# Patient Record
Sex: FEMALE | Race: WHITE | NOT HISPANIC OR LATINO | URBAN - METROPOLITAN AREA
[De-identification: names, ages, dates, MRNs, and addresses within clinical notes are randomized per-mention and may not be internally consistent; named-entity substitution may affect disease eponyms.]

---

## 2020-07-08 ENCOUNTER — OFFICE VISIT (OUTPATIENT)
Dept: URBAN - METROPOLITAN AREA MEDICAL CENTER 30 | Facility: MEDICAL CENTER | Age: 80
End: 2020-07-08

## 2021-07-07 ENCOUNTER — OFFICE VISIT (OUTPATIENT)
Dept: URBAN - METROPOLITAN AREA CLINIC 74 | Facility: CLINIC | Age: 81
End: 2021-07-07
Payer: MEDICARE

## 2021-07-07 ENCOUNTER — WEB ENCOUNTER (OUTPATIENT)
Dept: URBAN - METROPOLITAN AREA CLINIC 74 | Facility: CLINIC | Age: 81
End: 2021-07-07

## 2021-07-07 DIAGNOSIS — R13.10 PILL DYSPHAGIA: ICD-10-CM

## 2021-07-07 DIAGNOSIS — K21.9 GASTROESOPHAGEAL REFLUX DISEASE, UNSPECIFIED WHETHER ESOPHAGITIS PRESENT: ICD-10-CM

## 2021-07-07 PROCEDURE — 99203 OFFICE O/P NEW LOW 30 MIN: CPT | Performed by: STUDENT IN AN ORGANIZED HEALTH CARE EDUCATION/TRAINING PROGRAM

## 2021-07-07 RX ORDER — SUCRALFATE 1 G
1 TABLET ON AN EMPTY STOMACH TABLET ORAL QID
Qty: 120 TABLET | Refills: 3 | OUTPATIENT
Start: 2021-07-07 | End: 2021-11-04

## 2021-07-07 RX ORDER — NICOTINE POLACRILEX 2 MG
GUM BUCCAL
Qty: 0 | Refills: 0 | Status: DISCONTINUED | COMMUNITY
Start: 1900-01-01

## 2021-07-07 RX ORDER — ATENOLOL 25 MG/1
TABLET ORAL
Qty: 0 | Refills: 0 | Status: DISCONTINUED | COMMUNITY
Start: 1900-01-01

## 2021-07-07 NOTE — HPI-TODAY'S VISIT:
81-year-old female New to me Comes in to see me for evaluation of intermittent dysphagia specifically to the pill and upper abdomen discomfort. Patient has been on Eliquis for a long time but recently was switched to Pradaxa due to financial reasons.  Since she has started taking Pradaxa, she has experienced difficulty in swallowing the pill with some upper abdomen pain, indigestion and nausea.  This feeling of choking that happens with the pill sometimes also happen with liquids. Patient is little anxious at baseline and is trying to get back on Xarelto as she feels everything happened because of Pradaxa. Otherwise patient denies any abdomen pain.  No reflux.  Does not use PPI at baseline.  Regular bowel movements.  No constipation or diarrhea.  No blood in the stool.

## 2021-10-31 ENCOUNTER — OUT OF OFFICE VISIT (OUTPATIENT)
Dept: URBAN - METROPOLITAN AREA MEDICAL CENTER 30 | Facility: MEDICAL CENTER | Age: 81
End: 2021-10-31
Payer: MEDICARE

## 2021-10-31 ENCOUNTER — TELEPHONE ENCOUNTER (OUTPATIENT)
Dept: URBAN - METROPOLITAN AREA CLINIC 74 | Facility: CLINIC | Age: 81
End: 2021-10-31

## 2021-10-31 DIAGNOSIS — R13.19 CERVICAL DYSPHAGIA: ICD-10-CM

## 2021-10-31 DIAGNOSIS — I48.91 A-FIB: ICD-10-CM

## 2021-10-31 DIAGNOSIS — K21.9 ACID REFLUX: ICD-10-CM

## 2021-10-31 PROCEDURE — G8427 DOCREV CUR MEDS BY ELIG CLIN: HCPCS | Performed by: INTERNAL MEDICINE

## 2021-10-31 PROCEDURE — 99221 1ST HOSP IP/OBS SF/LOW 40: CPT | Performed by: INTERNAL MEDICINE

## 2021-11-02 ENCOUNTER — OUT OF OFFICE VISIT (OUTPATIENT)
Dept: URBAN - METROPOLITAN AREA MEDICAL CENTER 30 | Facility: MEDICAL CENTER | Age: 81
End: 2021-11-02
Payer: MEDICARE

## 2021-11-02 DIAGNOSIS — K21.9 ACID REFLUX: ICD-10-CM

## 2021-11-02 DIAGNOSIS — R13.12 DYSPHAGIA: ICD-10-CM

## 2021-11-02 PROCEDURE — G8427 DOCREV CUR MEDS BY ELIG CLIN: HCPCS | Performed by: INTERNAL MEDICINE

## 2021-11-02 PROCEDURE — 99233 SBSQ HOSP IP/OBS HIGH 50: CPT | Performed by: INTERNAL MEDICINE

## 2021-11-02 PROCEDURE — 99222 1ST HOSP IP/OBS MODERATE 55: CPT | Performed by: INTERNAL MEDICINE

## 2022-05-10 PROBLEM — 399122003: Status: ACTIVE | Noted: 2021-07-07

## 2022-05-12 ENCOUNTER — LAB OUTSIDE AN ENCOUNTER (OUTPATIENT)
Dept: URBAN - METROPOLITAN AREA CLINIC 74 | Facility: CLINIC | Age: 82
End: 2022-05-12

## 2022-05-12 ENCOUNTER — OFFICE VISIT (OUTPATIENT)
Dept: URBAN - METROPOLITAN AREA CLINIC 74 | Facility: CLINIC | Age: 82
End: 2022-05-12
Payer: MEDICARE

## 2022-05-12 DIAGNOSIS — R10.31 RLQ ABDOMINAL PAIN: ICD-10-CM

## 2022-05-12 DIAGNOSIS — Z87.19 HISTORY OF DYSPHAGIA: ICD-10-CM

## 2022-05-12 DIAGNOSIS — K21.9 GASTROESOPHAGEAL REFLUX DISEASE, UNSPECIFIED WHETHER ESOPHAGITIS PRESENT: ICD-10-CM

## 2022-05-12 DIAGNOSIS — R19.4 CHANGE IN BOWEL HABIT: ICD-10-CM

## 2022-05-12 PROCEDURE — 99214 OFFICE O/P EST MOD 30 MIN: CPT | Performed by: INTERNAL MEDICINE

## 2022-05-12 NOTE — HPI-TODAY'S VISIT:
81-year-old female New to me Comes in to see me for evaluation of intermittent dysphagia specifically to the pill and upper abdomen discomfort. Patient has been on Eliquis for a long time but recently was switched to Pradaxa due to financial reasons.  Since she has started taking Pradaxa, she has experienced difficulty in swallowing the pill with some upper abdomen pain, indigestion and nausea.  This feeling of choking that happens with the pill sometimes also happen with liquids. Patient is little anxious at baseline and is trying to get back on Xarelto as she feels everything happened because of Pradaxa. Otherwise patient denies any abdomen pain.  No reflux.  Does not use PPI at baseline.  Regular bowel movements.  No constipation or diarrhea.  No blood in the stool.  Today May 12 2022 the patient returns for a follow-up visit, the patient was last seen in the office on July 7, 2021 by Dr. Guadarrama with gastroesophageal reflux, pill dysphagia.  At the time of the visit the patient stated that he had been taking Eliquis for a long period of time and was recently switched to Pradaxa due to financial reasons.  Once he started taking Pradaxa he experienced difficulty in swallowing the pill along with some upper abdominal pain, indigestion and nausea.  The patient also complained of occasional dysphagia with liquids.  The patient was anxious to get back to Xarelto.  The patient otherwise had no other significant abnormalities, did not take any PPIs and was having normal regular bowel movements.  The patient was started on a PPI by Dr. Guadarrama the patient was encouraged to use Carafate as needed for symptomatic improvement and was told that if symptoms persisted he might need to be scheduled for an EGD.  To the right the patient returns to the office stating that she was hospitalized in February 2022 with COVID-19 and spent several weeks in the hospital.  While in the hospital she had recurrent episodes of nausea and started developing some discomfort over the right lower quadrant of the abdomen.  When she got released from the hospital the intensity and frequency of the pain over the right lower quadrant intensified.  The patient claims that she has had several episodes where she gets pain over the right lower quadrant, it is sharp intense almost disabling, it might last a couple hours, it occurs many times in the evening and along with it she gets some abdominal bloating, the desire to defecate and after straining she will pass either soft or liquid stool 1 or 2 times.  Once she empties out the pain gradually improves and then she will not have another episode for either weeks or days at the time.  The patient claimed having some nausea without any vomiting when having these episodes.  The patient denied having any hematochezia, rectal bleeding or melena.  Currently the patient takes Eliquis rather than Pradaxa and ever since she has not had any dysphagia or odynophagia, in the recent past she did have pill dysphagia.  The patient states that she was diagnosed with IBS-D years ago and used to be constipated and was taking MiraLAX on a regular basis so she could defecate.  Since she came out of the hospital she was able to defecate regular stools on a daily basis till she started getting some constipation and started using MiraLAX again.  This abdominal pain and loose stools are independent from using laxatives, any particular food ingestion or any activity.  The patient states that she has lost a significant amount of weight while in the hospital.  The patient will be recommended to get a CT of the abdomen and pelvis with contrast, the patient's last colonoscopy was performed in 2011 and we will try to get the report.  The patient will return for a follow-up visit after she gets the CT scan advised for follow-up.

## 2022-07-28 ENCOUNTER — CLAIMS CREATED FROM THE CLAIM WINDOW (OUTPATIENT)
Dept: URBAN - METROPOLITAN AREA CLINIC 74 | Facility: CLINIC | Age: 82
End: 2022-07-28
Payer: MEDICARE

## 2022-07-28 VITALS
OXYGEN SATURATION: 98 % | TEMPERATURE: 97.6 F | BODY MASS INDEX: 20.49 KG/M2 | HEIGHT: 64 IN | SYSTOLIC BLOOD PRESSURE: 123 MMHG | DIASTOLIC BLOOD PRESSURE: 70 MMHG | WEIGHT: 120 LBS | HEART RATE: 83 BPM

## 2022-07-28 DIAGNOSIS — Z98.890 HISTORY OF CARDIAC RADIOFREQUENCY ABLATION: ICD-10-CM

## 2022-07-28 DIAGNOSIS — Z86.010 PERSONAL HISTORY OF COLONIC POLYPS: ICD-10-CM

## 2022-07-28 DIAGNOSIS — Z79.01 CHRONIC ANTICOAGULATION: ICD-10-CM

## 2022-07-28 DIAGNOSIS — K58.1 IRRITABLE BOWEL SYNDROME WITH CONSTIPATION: ICD-10-CM

## 2022-07-28 DIAGNOSIS — Z86.16 HISTORY OF COVID-19: ICD-10-CM

## 2022-07-28 DIAGNOSIS — R10.31 RLQ ABDOMINAL PAIN: ICD-10-CM

## 2022-07-28 DIAGNOSIS — K21.9 GASTROESOPHAGEAL REFLUX DISEASE, UNSPECIFIED WHETHER ESOPHAGITIS PRESENT: ICD-10-CM

## 2022-07-28 DIAGNOSIS — K55.1 MESENTERIC ARTERY STENOSIS: ICD-10-CM

## 2022-07-28 DIAGNOSIS — R10.813 RIGHT LOWER QUADRANT ABDOMINAL TENDERNESS WITHOUT REBOUND TENDERNESS: ICD-10-CM

## 2022-07-28 DIAGNOSIS — Z80.0 FAMILY HISTORY OF COLON CANCER: ICD-10-CM

## 2022-07-28 DIAGNOSIS — Z95.0 PACEMAKER: ICD-10-CM

## 2022-07-28 DIAGNOSIS — Z87.19 HISTORY OF DYSPHAGIA: ICD-10-CM

## 2022-07-28 DIAGNOSIS — I77.1 CELIAC ARTERY STENOSIS: ICD-10-CM

## 2022-07-28 DIAGNOSIS — Z83.71 FAMILY HISTORY OF COLONIC POLYPS: ICD-10-CM

## 2022-07-28 DIAGNOSIS — Z86.79 HISTORY OF ATRIAL FIBRILLATION: ICD-10-CM

## 2022-07-28 DIAGNOSIS — K22.9 ESOPHAGEAL ABNORMALITY: ICD-10-CM

## 2022-07-28 DIAGNOSIS — R19.4 CHANGE IN BOWEL HABIT: ICD-10-CM

## 2022-07-28 PROBLEM — 448661001: Status: ACTIVE | Noted: 2022-05-12

## 2022-07-28 PROBLEM — 312442005: Status: ACTIVE | Noted: 2022-05-12

## 2022-07-28 PROBLEM — 16916101000119104: Status: ACTIVE | Noted: 2022-05-12

## 2022-07-28 PROBLEM — 16846341000119101: Status: ACTIVE | Noted: 2022-07-26

## 2022-07-28 PROBLEM — 441509002: Status: ACTIVE | Noted: 2022-05-12

## 2022-07-28 PROBLEM — 711150003: Status: ACTIVE | Noted: 2022-05-12

## 2022-07-28 PROBLEM — 301754002: Status: ACTIVE | Noted: 2022-05-12

## 2022-07-28 PROBLEM — 429969003: Status: ACTIVE | Noted: 2022-05-12

## 2022-07-28 PROBLEM — 440630006: Status: ACTIVE | Noted: 2022-05-12

## 2022-07-28 PROBLEM — 427951003: Status: ACTIVE | Noted: 2022-05-12

## 2022-07-28 PROBLEM — 129851009: Status: ACTIVE | Noted: 2022-05-12

## 2022-07-28 PROBLEM — 405545007: Status: ACTIVE | Noted: 2022-07-26

## 2022-07-28 PROBLEM — 235595009 GASTROESOPHAGEAL REFLUX DISEASE: Status: ACTIVE | Noted: 2022-05-10

## 2022-07-28 PROBLEM — 428283002: Status: ACTIVE | Noted: 2022-05-12

## 2022-07-28 PROBLEM — 292508471000119105: Status: ACTIVE | Noted: 2022-05-12

## 2022-07-28 PROCEDURE — 99213 OFFICE O/P EST LOW 20 MIN: CPT | Performed by: INTERNAL MEDICINE

## 2022-07-28 NOTE — HPI-TODAY'S VISIT:
81-year-old female New to me Comes in to see me for evaluation of intermittent dysphagia specifically to the pill and upper abdomen discomfort. Patient has been on Eliquis for a long time but recently was switched to Pradaxa due to financial reasons.  Since she has started taking Pradaxa, she has experienced difficulty in swallowing the pill with some upper abdomen pain, indigestion and nausea.  This feeling of choking that happens with the pill sometimes also happen with liquids. Patient is little anxious at baseline and is trying to get back on Xarelto as she feels everything happened because of Pradaxa. Otherwise patient denies any abdomen pain.  No reflux.  Does not use PPI at baseline.  Regular bowel movements.  No constipation or diarrhea.  No blood in the stool.  Today May 12 2022 the patient returns for a follow-up visit, the patient was last seen in the office on July 7, 2021 by Dr. Guadarrama with gastroesophageal reflux, pill dysphagia.  At the time of the visit the patient stated that he had been taking Eliquis for a long period of time and was recently switched to Pradaxa due to financial reasons.  Once he started taking Pradaxa he experienced difficulty in swallowing the pill along with some upper abdominal pain, indigestion and nausea.  The patient also complained of occasional dysphagia with liquids.  The patient was anxious to get back to Xarelto.  The patient otherwise had no other significant abnormalities, did not take any PPIs and was having normal regular bowel movements.  The patient was started on a PPI by Dr. Guadarrama the patient was encouraged to use Carafate as needed for symptomatic improvement and was told that if symptoms persisted he might need to be scheduled for an EGD.  To the right the patient returns to the office stating that she was hospitalized in February 2022 with COVID-19 and spent several weeks in the hospital.  While in the hospital she had recurrent episodes of nausea and started developing some discomfort over the right lower quadrant of the abdomen.  When she got released from the hospital the intensity and frequency of the pain over the right lower quadrant intensified.  The patient claims that she has had several episodes where she gets pain over the right lower quadrant, it is sharp intense almost disabling, it might last a couple hours, it occurs many times in the evening and along with it she gets some abdominal bloating, the desire to defecate and after straining she will pass either soft or liquid stool 1 or 2 times.  Once she empties out the pain gradually improves and then she will not have another episode for either weeks or days at the time.  The patient claimed having some nausea without any vomiting when having these episodes.  The patient denied having any hematochezia, rectal bleeding or melena.  Currently the patient takes Eliquis rather than Pradaxa and ever since she has not had any dysphagia or odynophagia, in the recent past she did have pill dysphagia.  The patient states that she was diagnosed with IBS-D years ago and used to be constipated and was taking MiraLAX on a regular basis so she could defecate.  Since she came out of the hospital she was able to defecate regular stools on a daily basis till she started getting some constipation and started using MiraLAX again.  This abdominal pain and loose stools are independent from using laxatives, any particular food ingestion or any activity.  The patient states that she has lost a significant amount of weight while in the hospital.  The patient will be recommended to get a CT of the abdomen and pelvis with contrast, the patient's last colonoscopy was performed in 2011 and we will try to get the report.  The patient will return for a follow-up visit after she gets the CT scan advised for follow-up.  Today July 28, 2022 the patient returns for a follow-up visit, the patient was last seen in the office on May 12, 2022 with gastroesophageal reflux, history of dysphagia, right lower quadrant abdominal pain and tenderness, irritable bowel syndrome with constipation, change in bowel habit, family history of colon polyps and colon cancer, personal history of colon polyps, the patient had a pacemaker, did have atrial fibrillation and was chronically anticoagulated and had history of cardiac radiofrequency ablation and previous COVID-19 infection.  At the time of the last visit the patient was taking Eliquis rather than Pradaxa and ever since the patient had no dysphagia or odynophagia, in the past she did have pill dysphagia.  The patient had been diagnosed with IBS-D years ago used to be constipated and was taking MiraLAX on a regular basis, once the patient was released from the hospital was able to defecate on a regular basis till she started getting some constipation and had to take MiraLAX again.  The patient had abdominal discomfort and loose stools independent from using laxatives any particular food or activity, the patient complained of a significant weight loss while hospitalized.  The patient had been hospitalized in February for COVID-19 infection.  The patient was recommended to get a CT of abdomen and pelvis with contrast, the patient's last colonoscopy performed in 2011 and we were trying to get the last report.   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  Today the patient returns to the office stating that she has significantly improved, she has occasional discomfort in the right lower quadrant which improves after defecation.  The patient claims that she has been defecating on a daily basis without the help of laxatives, in the past she used to have constipation.  The patient denies having any rectal bleeding or hematochezia.  There has been no abdominal bloating, no nausea no vomiting, there is no abdominal distention for pain associated with meals.  Laboratory obtained on July 11, 2022 revealed normal renal function, the patient's LFTs were normal.  The patient CBC revealed a white cell count of 7.08 with an H&H of 13.9 and 43.6, normal differential and platelet count.  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  There were no gallstones, no biliary ductal dilatation except for extrahepatic ductal dilatation measuring 1.1 cm with no pancreatic abnormalities except for atrophy, there were splenic granuloma.  The patient is currently taking pantoprazole 40 mg daily, MiraLAX as needed.  We discussed the patient's past history of colonic polyps and family history of colon cancer and colon polyps.  The patient is currently not interested in getting a surveillance colonoscopy.  The patient is anticoagulated and there is some concern about getting her off anticoagulation.  The patient decided not to have any invasive procedures unless they are lifesaving.  The patient will return for a follow-up visit as needed.

## 2023-04-20 ENCOUNTER — OFFICE VISIT (OUTPATIENT)
Dept: URBAN - METROPOLITAN AREA CLINIC 74 | Facility: CLINIC | Age: 83
End: 2023-04-20
Payer: MEDICARE

## 2023-04-20 ENCOUNTER — LAB OUTSIDE AN ENCOUNTER (OUTPATIENT)
Dept: URBAN - METROPOLITAN AREA CLINIC 74 | Facility: CLINIC | Age: 83
End: 2023-04-20

## 2023-04-20 VITALS
DIASTOLIC BLOOD PRESSURE: 70 MMHG | BODY MASS INDEX: 21.68 KG/M2 | HEART RATE: 77 BPM | HEIGHT: 64 IN | SYSTOLIC BLOOD PRESSURE: 128 MMHG | WEIGHT: 127 LBS

## 2023-04-20 DIAGNOSIS — K58.1 IRRITABLE BOWEL SYNDROME WITH CONSTIPATION: ICD-10-CM

## 2023-04-20 DIAGNOSIS — K21.9 GASTROESOPHAGEAL REFLUX DISEASE, UNSPECIFIED WHETHER ESOPHAGITIS PRESENT: ICD-10-CM

## 2023-04-20 DIAGNOSIS — R10.31 RLQ ABDOMINAL PAIN: ICD-10-CM

## 2023-04-20 DIAGNOSIS — K22.9 ESOPHAGEAL ABNORMALITY: ICD-10-CM

## 2023-04-20 PROBLEM — 37657006: Status: ACTIVE | Noted: 2023-04-20

## 2023-04-20 PROCEDURE — 99213 OFFICE O/P EST LOW 20 MIN: CPT | Performed by: INTERNAL MEDICINE

## 2023-04-20 RX ORDER — HYOSCYAMINE SULFATE 0.12 MG/1
1 TABLET UNDER THE TONGUE AND ALLOW TO DISSOLVE AS NEEDED TABLET, ORALLY DISINTEGRATING ORAL DAILY
Qty: 30 TABLET | Refills: 1 | OUTPATIENT
Start: 2023-04-20 | End: 2023-06-19

## 2023-04-20 NOTE — PHYSICAL EXAM GASTROINTESTINAL
Abdomen, soft, tender RLQ, nondistended, no guarding or rigidity, no masses palpable, normal bowel sounds, Liver and Spleen,  no hepatosplenomegaly, liver nontender

## 2023-04-20 NOTE — HPI-TODAY'S VISIT:
81-year-old female New to me Comes in to see me for evaluation of intermittent dysphagia specifically to the pill and upper abdomen discomfort. Patient has been on Eliquis for a long time but recently was switched to Pradaxa due to financial reasons.  Since she has started taking Pradaxa, she has experienced difficulty in swallowing the pill with some upper abdomen pain, indigestion and nausea.  This feeling of choking that happens with the pill sometimes also happen with liquids. Patient is little anxious at baseline and is trying to get back on Xarelto as she feels everything happened because of Pradaxa. Otherwise patient denies any abdomen pain.  No reflux.  Does not use PPI at baseline.  Regular bowel movements.  No constipation or diarrhea.  No blood in the stool.  Today May 12 2022 the patient returns for a follow-up visit, the patient was last seen in the office on July 7, 2021 by Dr. Guadarrama with gastroesophageal reflux, pill dysphagia.  At the time of the visit the patient stated that he had been taking Eliquis for a long period of time and was recently switched to Pradaxa due to financial reasons.  Once he started taking Pradaxa he experienced difficulty in swallowing the pill along with some upper abdominal pain, indigestion and nausea.  The patient also complained of occasional dysphagia with liquids.  The patient was anxious to get back to Xarelto.  The patient otherwise had no other significant abnormalities, did not take any PPIs and was having normal regular bowel movements.  The patient was started on a PPI by Dr. Guadarrama the patient was encouraged to use Carafate as needed for symptomatic improvement and was told that if symptoms persisted he might need to be scheduled for an EGD.  To the right the patient returns to the office stating that she was hospitalized in February 2022 with COVID-19 and spent several weeks in the hospital.  While in the hospital she had recurrent episodes of nausea and started developing some discomfort over the right lower quadrant of the abdomen.  When she got released from the hospital the intensity and frequency of the pain over the right lower quadrant intensified.  The patient claims that she has had several episodes where she gets pain over the right lower quadrant, it is sharp intense almost disabling, it might last a couple hours, it occurs many times in the evening and along with it she gets some abdominal bloating, the desire to defecate and after straining she will pass either soft or liquid stool 1 or 2 times.  Once she empties out the pain gradually improves and then she will not have another episode for either weeks or days at the time.  The patient claimed having some nausea without any vomiting when having these episodes.  The patient denied having any hematochezia, rectal bleeding or melena.  Currently the patient takes Eliquis rather than Pradaxa and ever since she has not had any dysphagia or odynophagia, in the recent past she did have pill dysphagia.  The patient states that she was diagnosed with IBS-D years ago and used to be constipated and was taking MiraLAX on a regular basis so she could defecate.  Since she came out of the hospital she was able to defecate regular stools on a daily basis till she started getting some constipation and started using MiraLAX again.  This abdominal pain and loose stools are independent from using laxatives, any particular food ingestion or any activity.  The patient states that she has lost a significant amount of weight while in the hospital.  The patient will be recommended to get a CT of the abdomen and pelvis with contrast, the patient's last colonoscopy was performed in 2011 and we will try to get the report.  The patient will return for a follow-up visit after she gets the CT scan advised for follow-up.  Today July 28, 2022 the patient returns for a follow-up visit, the patient was last seen in the office on May 12, 2022 with gastroesophageal reflux, history of dysphagia, right lower quadrant abdominal pain and tenderness, irritable bowel syndrome with constipation, change in bowel habit, family history of colon polyps and colon cancer, personal history of colon polyps, the patient had a pacemaker, did have atrial fibrillation and was chronically anticoagulated and had history of cardiac radiofrequency ablation and previous COVID-19 infection.  At the time of the last visit the patient was taking Eliquis rather than Pradaxa and ever since the patient had no dysphagia or odynophagia, in the past she did have pill dysphagia.  The patient had been diagnosed with IBS-D years ago used to be constipated and was taking MiraLAX on a regular basis, once the patient was released from the hospital was able to defecate on a regular basis till she started getting some constipation and had to take MiraLAX again.  The patient had abdominal discomfort and loose stools independent from using laxatives any particular food or activity, the patient complained of a significant weight loss while hospitalized.  The patient had been hospitalized in February for COVID-19 infection.  The patient was recommended to get a CT of abdomen and pelvis with contrast, the patient's last colonoscopy performed in 2011 and we were trying to get the last report.   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  Today the patient returns to the office stating that she has significantly improved, she has occasional discomfort in the right lower quadrant which improves after defecation.  The patient claims that she has been defecating on a daily basis without the help of laxatives, in the past she used to have constipation.  The patient denies having any rectal bleeding or hematochezia.  There has been no abdominal bloating, no nausea no vomiting, there is no abdominal distention for pain associated with meals.  Laboratory obtained on July 11, 2022 revealed normal renal function, the patient's LFTs were normal.  The patient CBC revealed a white cell count of 7.08 with an H&H of 13.9 and 43.6, normal differential and platelet count.  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  There were no gallstones, no biliary ductal dilatation except for extrahepatic ductal dilatation measuring 1.1 cm with no pancreatic abnormalities except for atrophy, there were splenic granuloma.  The patient is currently taking pantoprazole 40 mg daily, MiraLAX as needed.  We discussed the patient's past history of colonic polyps and family history of colon cancer and colon polyps.  The patient is currently not interested in getting a surveillance colonoscopy.  The patient is anticoagulated and there is some concern about getting her off anticoagulation.  The patient decided not to have any invasive procedures unless they are lifesaving.  The patient will return for a follow-up visit as needed.  Today April 20, 2023 the patient returns for a follow-up visit, the patient was last seen on July 28, 2022 with an esophageal abnormality, gastroesophageal reflux, history of dysphagia, right lower quadrant abdominal pain and tenderness, change in bowel habit, IBS with constipation, personal history of colonic polyps, family history of colon cancer, family history of colon polyps, celiac artery and mesenteric artery stenosis, history of cardiac radiofrequency ablation for atrial fibrillation on chronic anticoagulation and with a pacemaker.  At the time of the last visit the patient appeared improved, the patient had occasional discomfort in the right lower quadrant, it improved after defecation.  The patient has been defecating on a daily basis without the help of laxatives, in the past the patient used to have constipation.  The patient denied having any rectal bleeding or hematochezia, there was no abdominal bloating, no nausea or vomiting, there was no abdominal distention, there was no pain associated with meals.  Laboratory obtained July 11, 2022 revealed normal renal function, the LFTs were normal.  The CBC revealed a white cell count of 7.08, H&H 13.9 and 43.6 with a normal differential and normal platelet count.  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  There were no gallstones, bile ducts were normal, there was extrahepatic ductal dilatation measuring 1.1 cm, there were no pancreatic abnormalities except for atrophy, there was splenic granuloma.  The patient was taking pantoprazole 40 mg daily and MiraLAX as needed.  At the time we discussed the patient's family history of colonic cancer and colonic polyps as well as the history of colonic polyps on the patient.  The patient did not appear to be a good candidate for surveillance colonoscopy in view of the patient's age and comorbidities.  The patient was advised to return for follow-up as needed.  Today the patient returns to the office stating that she continues to have right lower quadrant pain, it occurs maybe once a month, it occurs at the time of defecation when constipated, she claims that once she gets the desire to defecate and is in the process of defecating she gets intense discomfort over the right lower quadrant which radiates towards the perineum and the vagina, it is intense and disabling, it improves after passage of flatus and stool, it can last up to an hour.  There is no associated nausea, vomiting, abdominal distention, diarrhea, rectal bleeding or hematochezia, fever.  The patient is concerned about developing colon cancer but states that she is not going to have any invasive procedures including a colonoscopy in view of her general health and age.  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  The patient agreed to get a CT enterography and will be treated with sublingual Levsin 0.125 mg as needed pain.  The patient will return for a follow-up visit after completion of testing.

## 2023-05-12 ENCOUNTER — TELEPHONE ENCOUNTER (OUTPATIENT)
Dept: URBAN - METROPOLITAN AREA CLINIC 74 | Facility: CLINIC | Age: 83
End: 2023-05-12

## 2023-05-17 ENCOUNTER — TELEPHONE ENCOUNTER (OUTPATIENT)
Dept: URBAN - METROPOLITAN AREA CLINIC 74 | Facility: CLINIC | Age: 83
End: 2023-05-17

## 2023-05-18 ENCOUNTER — OFFICE VISIT (OUTPATIENT)
Dept: URBAN - METROPOLITAN AREA CLINIC 74 | Facility: CLINIC | Age: 83
End: 2023-05-18

## 2023-06-05 ENCOUNTER — OFFICE VISIT (OUTPATIENT)
Dept: URBAN - METROPOLITAN AREA CLINIC 74 | Facility: CLINIC | Age: 83
End: 2023-06-05
Payer: MEDICARE

## 2023-06-05 ENCOUNTER — WEB ENCOUNTER (OUTPATIENT)
Dept: URBAN - METROPOLITAN AREA CLINIC 74 | Facility: CLINIC | Age: 83
End: 2023-06-05

## 2023-06-05 VITALS
TEMPERATURE: 97.7 F | BODY MASS INDEX: 22.2 KG/M2 | DIASTOLIC BLOOD PRESSURE: 60 MMHG | SYSTOLIC BLOOD PRESSURE: 128 MMHG | WEIGHT: 130 LBS | HEIGHT: 64 IN | HEART RATE: 97 BPM

## 2023-06-05 DIAGNOSIS — R10.31 RLQ ABDOMINAL PAIN: ICD-10-CM

## 2023-06-05 DIAGNOSIS — K21.9 GASTROESOPHAGEAL REFLUX DISEASE, UNSPECIFIED WHETHER ESOPHAGITIS PRESENT: ICD-10-CM

## 2023-06-05 DIAGNOSIS — K22.9 ESOPHAGEAL ABNORMALITY: ICD-10-CM

## 2023-06-05 DIAGNOSIS — K58.1 IRRITABLE BOWEL SYNDROME WITH CONSTIPATION: ICD-10-CM

## 2023-06-05 PROCEDURE — 99213 OFFICE O/P EST LOW 20 MIN: CPT | Performed by: INTERNAL MEDICINE

## 2023-06-05 RX ORDER — HYOSCYAMINE SULFATE 0.12 MG/1
1 TABLET UNDER THE TONGUE AND ALLOW TO DISSOLVE AS NEEDED TABLET, ORALLY DISINTEGRATING ORAL DAILY
Qty: 30 TABLET | Refills: 1 | Status: ACTIVE | COMMUNITY
Start: 2023-04-20 | End: 2023-06-19

## 2023-06-05 RX ORDER — PANTOPRAZOLE SODIUM 40 MG/1
1 TABLET TABLET, DELAYED RELEASE ORAL ONCE A DAY
Qty: 90 | Refills: 3

## 2023-06-05 RX ORDER — HYOSCYAMINE SULFATE 0.12 MG/1
1 TABLET UNDER THE TONGUE AND ALLOW TO DISSOLVE AS NEEDED TABLET, ORALLY DISINTEGRATING ORAL THREE TIMES A DAY
Qty: 270 TABLET | Refills: 1
Start: 2023-04-20 | End: 2023-09-03

## 2023-06-05 NOTE — HPI-TODAY'S VISIT:
81-year-old female New to me Comes in to see me for evaluation of intermittent dysphagia specifically to the pill and upper abdomen discomfort. Patient has been on Eliquis for a long time but recently was switched to Pradaxa due to financial reasons.  Since she has started taking Pradaxa, she has experienced difficulty in swallowing the pill with some upper abdomen pain, indigestion and nausea.  This feeling of choking that happens with the pill sometimes also happen with liquids. Patient is little anxious at baseline and is trying to get back on Xarelto as she feels everything happened because of Pradaxa. Otherwise patient denies any abdomen pain.  No reflux.  Does not use PPI at baseline.  Regular bowel movements.  No constipation or diarrhea.  No blood in the stool.  Today May 12 2022 the patient returns for a follow-up visit, the patient was last seen in the office on July 7, 2021 by Dr. Guadarrama with gastroesophageal reflux, pill dysphagia.  At the time of the visit the patient stated that he had been taking Eliquis for a long period of time and was recently switched to Pradaxa due to financial reasons.  Once he started taking Pradaxa he experienced difficulty in swallowing the pill along with some upper abdominal pain, indigestion and nausea.  The patient also complained of occasional dysphagia with liquids.  The patient was anxious to get back to Xarelto.  The patient otherwise had no other significant abnormalities, did not take any PPIs and was having normal regular bowel movements.  The patient was started on a PPI by Dr. Guadarrama the patient was encouraged to use Carafate as needed for symptomatic improvement and was told that if symptoms persisted he might need to be scheduled for an EGD.  To the right the patient returns to the office stating that she was hospitalized in February 2022 with COVID-19 and spent several weeks in the hospital.  While in the hospital she had recurrent episodes of nausea and started developing some discomfort over the right lower quadrant of the abdomen.  When she got released from the hospital the intensity and frequency of the pain over the right lower quadrant intensified.  The patient claims that she has had several episodes where she gets pain over the right lower quadrant, it is sharp intense almost disabling, it might last a couple hours, it occurs many times in the evening and along with it she gets some abdominal bloating, the desire to defecate and after straining she will pass either soft or liquid stool 1 or 2 times.  Once she empties out the pain gradually improves and then she will not have another episode for either weeks or days at the time.  The patient claimed having some nausea without any vomiting when having these episodes.  The patient denied having any hematochezia, rectal bleeding or melena.  Currently the patient takes Eliquis rather than Pradaxa and ever since she has not had any dysphagia or odynophagia, in the recent past she did have pill dysphagia.  The patient states that she was diagnosed with IBS-D years ago and used to be constipated and was taking MiraLAX on a regular basis so she could defecate.  Since she came out of the hospital she was able to defecate regular stools on a daily basis till she started getting some constipation and started using MiraLAX again.  This abdominal pain and loose stools are independent from using laxatives, any particular food ingestion or any activity.  The patient states that she has lost a significant amount of weight while in the hospital.  The patient will be recommended to get a CT of the abdomen and pelvis with contrast, the patient's last colonoscopy was performed in 2011 and we will try to get the report.  The patient will return for a follow-up visit after she gets the CT scan advised for follow-up.  Today July 28, 2022 the patient returns for a follow-up visit, the patient was last seen in the office on May 12, 2022 with gastroesophageal reflux, history of dysphagia, right lower quadrant abdominal pain and tenderness, irritable bowel syndrome with constipation, change in bowel habit, family history of colon polyps and colon cancer, personal history of colon polyps, the patient had a pacemaker, did have atrial fibrillation and was chronically anticoagulated and had history of cardiac radiofrequency ablation and previous COVID-19 infection.  At the time of the last visit the patient was taking Eliquis rather than Pradaxa and ever since the patient had no dysphagia or odynophagia, in the past she did have pill dysphagia.  The patient had been diagnosed with IBS-D years ago used to be constipated and was taking MiraLAX on a regular basis, once the patient was released from the hospital was able to defecate on a regular basis till she started getting some constipation and had to take MiraLAX again.  The patient had abdominal discomfort and loose stools independent from using laxatives any particular food or activity, the patient complained of a significant weight loss while hospitalized.  The patient had been hospitalized in February for COVID-19 infection.  The patient was recommended to get a CT of abdomen and pelvis with contrast, the patient's last colonoscopy performed in 2011 and we were trying to get the last report.   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  Today the patient returns to the office stating that she has significantly improved, she has occasional discomfort in the right lower quadrant which improves after defecation.  The patient claims that she has been defecating on a daily basis without the help of laxatives, in the past she used to have constipation.  The patient denies having any rectal bleeding or hematochezia.  There has been no abdominal bloating, no nausea no vomiting, there is no abdominal distention for pain associated with meals.  Laboratory obtained on July 11, 2022 revealed normal renal function, the patient's LFTs were normal.  The patient CBC revealed a white cell count of 7.08 with an H&H of 13.9 and 43.6, normal differential and platelet count.  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  There were no gallstones, no biliary ductal dilatation except for extrahepatic ductal dilatation measuring 1.1 cm with no pancreatic abnormalities except for atrophy, there were splenic granuloma.  The patient is currently taking pantoprazole 40 mg daily, MiraLAX as needed.  We discussed the patient's past history of colonic polyps and family history of colon cancer and colon polyps.  The patient is currently not interested in getting a surveillance colonoscopy.  The patient is anticoagulated and there is some concern about getting her off anticoagulation.  The patient decided not to have any invasive procedures unless they are lifesaving.  The patient will return for a follow-up visit as needed.  Today April 20, 2023 the patient returns for a follow-up visit, the patient was last seen on July 28, 2022 with an esophageal abnormality, gastroesophageal reflux, history of dysphagia, right lower quadrant abdominal pain and tenderness, change in bowel habit, IBS with constipation, personal history of colonic polyps, family history of colon cancer, family history of colon polyps, celiac artery and mesenteric artery stenosis, history of cardiac radiofrequency ablation for atrial fibrillation on chronic anticoagulation and with a pacemaker.  At the time of the last visit the patient appeared improved, the patient had occasional discomfort in the right lower quadrant, it improved after defecation.  The patient has been defecating on a daily basis without the help of laxatives, in the past the patient used to have constipation.  The patient denied having any rectal bleeding or hematochezia, there was no abdominal bloating, no nausea or vomiting, there was no abdominal distention, there was no pain associated with meals.  Laboratory obtained July 11, 2022 revealed normal renal function, the LFTs were normal.  The CBC revealed a white cell count of 7.08, H&H 13.9 and 43.6 with a normal differential and normal platelet count.  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  There were no gallstones, bile ducts were normal, there was extrahepatic ductal dilatation measuring 1.1 cm, there were no pancreatic abnormalities except for atrophy, there was splenic granuloma.  The patient was taking pantoprazole 40 mg daily and MiraLAX as needed.  At the time we discussed the patient's family history of colonic cancer and colonic polyps as well as the history of colonic polyps on the patient.  The patient did not appear to be a good candidate for surveillance colonoscopy in view of the patient's age and comorbidities.  The patient was advised to return for follow-up as needed.  Today the patient returns to the office stating that she continues to have right lower quadrant pain, it occurs maybe once a month, it occurs at the time of defecation when constipated, she claims that once she gets the desire to defecate and is in the process of defecating she gets intense discomfort over the right lower quadrant which radiates towards the perineum and the vagina, it is intense and disabling, it improves after passage of flatus and stool, it can last up to an hour.  There is no associated nausea, vomiting, abdominal distention, diarrhea, rectal bleeding or hematochezia, fever.  The patient is concerned about developing colon cancer but states that she is not going to have any invasive procedures including a colonoscopy in view of her general health and age.  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  The patient agreed to get a CT enterography and will be treated with sublingual Levsin 0.125 mg as needed pain.  The patient will return for a follow-up visit after completion of testing.  Today June 5, 2023 the patient returns for a follow-up visit, the patient was last seen on April 20, 2023 with right lower quadrant abdominal pain and tenderness, gastroesophageal reflux, history of dysphagia, IBS with constipation, change in bowel habit, personal history of colonic polyps, family history of colon polyps and colon cancer, mesenteric artery stenosis, celiac artery stenosis pacemaker, history of COVID-19, at the time of the last visit the patient continued to have right lower quadrant pain, it did occur maybe once a month, it did occur at the time of defecation when constipated, once the patient get the desire to defecate and was in the process of defecating she would get intense discomfort on the right lower quadrant which radiated towards the perineum and the vagina.  It was almost disabling, it improved after passage of flatus and stool, the discomfort could last up to an hour, there was no associated nausea, vomiting, distention, diarrhea, rectal bleeding or hematochezia and there was no fever.  The patient was rather concerned about having colon cancer but she did not want to have any invasive procedures including a colonoscopy in view of her general health and age.  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  The patient agreed to get a CT enterography and to start treatment with sublingual Levsin 0.125 mg as needed.  The CT enterography was performed on May 30, 2023 and it failed to show any evidence of wall bowel thickening, there was no mucosal enhancement or perienteric inflammation to suggest active inflammation, there was sigmoid diverticulosis without local inflammation, there was small hiatal hernia and distal esophageal thickening there was no evidence of mechanical small bowel obstruction and the appendix appeared to be normal.  The lower chest appeared to be normal and liver failed to show any abnormalities there was no biliary ductal dilatation, there were no gallstones and the gallbladder wall appeared to be normal, the spleen and pancreas failed to show any abnormalities except for diffuse atrophy of the pancreas.  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  There was chronic hamstring tendinosis.  Today the patient returns to the office stating that she continues to have intermittent episodes of right lower quadrant and suprapubic discomfort after defecation.  It seems that the sublingual Levsin did help and she is not taking it almost daily.  She denies having any diarrhea, constipation, rectal bleeding or hematochezia.  I discussed with the patient at length the recent CT enterography which revealed sigmoid diverticulosis without evidence of diverticulitis, I did make her aware of the vascular changes including moderate stenosis at the origins of the celiac and superior mesenteric arteries with no evidence of total occlusion or symptomatic angina, there was no evidence of any active inflammatory bowel disease.  The patient does have lumbar facet arthrosis and no other destructive osseous lesions.  The patient will remain on a high-fiber diet will take Colace 100 mg daily and will take sublingual Levsin up to 3 times a day as needed.  The patient will return for a follow-up visit as needed.

## 2023-06-08 ENCOUNTER — OFFICE VISIT (OUTPATIENT)
Dept: URBAN - METROPOLITAN AREA CLINIC 74 | Facility: CLINIC | Age: 83
End: 2023-06-08

## 2023-07-03 ENCOUNTER — OFFICE VISIT (OUTPATIENT)
Dept: URBAN - METROPOLITAN AREA CLINIC 74 | Facility: CLINIC | Age: 83
End: 2023-07-03

## 2023-09-25 ENCOUNTER — TELEPHONE ENCOUNTER (OUTPATIENT)
Dept: URBAN - METROPOLITAN AREA CLINIC 40 | Facility: CLINIC | Age: 83
End: 2023-09-25

## 2023-09-25 ENCOUNTER — WEB ENCOUNTER (OUTPATIENT)
Dept: URBAN - METROPOLITAN AREA CLINIC 74 | Facility: CLINIC | Age: 83
End: 2023-09-25

## 2023-09-25 ENCOUNTER — DASHBOARD ENCOUNTERS (OUTPATIENT)
Age: 83
End: 2023-09-25

## 2023-09-25 ENCOUNTER — OFFICE VISIT (OUTPATIENT)
Dept: URBAN - METROPOLITAN AREA CLINIC 74 | Facility: CLINIC | Age: 83
End: 2023-09-25
Payer: MEDICARE

## 2023-09-25 VITALS
HEART RATE: 75 BPM | TEMPERATURE: 96.8 F | DIASTOLIC BLOOD PRESSURE: 70 MMHG | HEIGHT: 64 IN | WEIGHT: 133.2 LBS | BODY MASS INDEX: 22.74 KG/M2 | SYSTOLIC BLOOD PRESSURE: 110 MMHG

## 2023-09-25 DIAGNOSIS — Z95.0 PACEMAKER: ICD-10-CM

## 2023-09-25 DIAGNOSIS — Z80.0 FAMILY HISTORY OF COLON CANCER: ICD-10-CM

## 2023-09-25 DIAGNOSIS — I77.1 CELIAC ARTERY STENOSIS: ICD-10-CM

## 2023-09-25 DIAGNOSIS — K58.0 IRRITABLE BOWEL SYNDROME WITH DIARRHEA: ICD-10-CM

## 2023-09-25 DIAGNOSIS — Z86.79 HISTORY OF ATRIAL FIBRILLATION: ICD-10-CM

## 2023-09-25 DIAGNOSIS — R10.32 LLQ PAIN: ICD-10-CM

## 2023-09-25 DIAGNOSIS — K55.1 MESENTERIC ARTERY STENOSIS: ICD-10-CM

## 2023-09-25 DIAGNOSIS — Z86.16 HISTORY OF COVID-19: ICD-10-CM

## 2023-09-25 DIAGNOSIS — K21.9 ACID REFLUX: ICD-10-CM

## 2023-09-25 DIAGNOSIS — Z79.01 CHRONIC ANTICOAGULATION: ICD-10-CM

## 2023-09-25 DIAGNOSIS — R10.31 RLQ ABDOMINAL PAIN: ICD-10-CM

## 2023-09-25 PROBLEM — 116289008: Status: ACTIVE | Noted: 2023-09-25

## 2023-09-25 PROBLEM — 197125005: Status: ACTIVE | Noted: 2023-09-25

## 2023-09-25 PROCEDURE — 99213 OFFICE O/P EST LOW 20 MIN: CPT | Performed by: NURSE PRACTITIONER

## 2023-09-25 RX ORDER — RIFAXIMIN 550 MG/1
1 TABLET TABLET ORAL TWICE A DAY
Qty: 20 TABLET | Refills: 0 | OUTPATIENT
Start: 2023-09-25 | End: 2023-10-05

## 2023-09-25 RX ORDER — RIFAXIMIN 550 MG/1
1 TABLET TABLET ORAL TWICE A DAY
Qty: 20 TABLET | Refills: 0 | Status: ACTIVE | COMMUNITY
Start: 2023-09-25 | End: 2023-10-05

## 2023-09-25 RX ORDER — PANTOPRAZOLE SODIUM 40 MG/1
1 TABLET TABLET, DELAYED RELEASE ORAL ONCE A DAY
Qty: 90 | Refills: 3 | Status: ACTIVE | COMMUNITY

## 2023-09-25 RX ORDER — RIFAXIMIN 550 MG/1
1 TABLET TABLET ORAL
Qty: 42 | Refills: 0 | OUTPATIENT
Start: 2023-09-25 | End: 2023-10-09

## 2023-09-25 NOTE — PHYSICAL EXAM CONSTITUTIONAL:
well developed, well nourished , in no acute distress , ambulating without difficulty , normal communication ability, elderly

## 2023-09-25 NOTE — HPI-TODAY'S VISIT:
83-year-old female patient with past medical history as listed below, known to Dr. Renee. Last office visit June 5, 2023 for right lower quadrant abdominal tenderness, CT scan follow-up, refilled Levsin tablets, refilled pantoprazole for GERD. She had come in to see him for evaluation of intermittent dysphagia previously. CT enterography was done May 30, 2023 failed to show any evidence of bowel wall thickening, there was sigmoid diverticulosis without local inflammation, there was a small hiatal hernia and distal esophageal thickening, there was no evidence of mechanical small bowel obstruction and the appendix appeared to be normal. Dr. Renee went over the CT enterography with her, Levsin was helping with her abdominal pain. The patient did have lumbar facet arthrosis and was to remain on a high-fiber diet with Colace and Levsin up to 3 times a day as needed.  -- The patient presents today with very similar complaints as the last visit this time she has some left lower quadrant pain that radiates up to the rib and around to the back she describes it as a gas pain.  She states that if she sits for more than 45 minutes that her stomach will start hurting and bloating especially at [employer] or in the car and she feels like it is trapped gas.  Once she stands up it is relieved after a few minutes.  It is also relieved after passing some gas.  She also complains of burping frequently and always being gassy.  She says this has been this way for months and that it really all started after having COVID.  Says when she passes gas she feels better.  She says she has been working with Dr. Lynch's PA and she has been doing therapy for her back.  She states she has IBS.  States she does not really take the Levsin that much as she does not know that it really helps.  She has started taking Gas-X before [employer] but does not feel like that helps very much either.  Denies any dark stools or hematochezia or dysphagia.

## 2023-09-27 ENCOUNTER — TELEPHONE ENCOUNTER (OUTPATIENT)
Dept: URBAN - METROPOLITAN AREA CLINIC 40 | Facility: CLINIC | Age: 83
End: 2023-09-27

## 2023-09-29 ENCOUNTER — TELEPHONE ENCOUNTER (OUTPATIENT)
Dept: URBAN - METROPOLITAN AREA CLINIC 40 | Facility: CLINIC | Age: 83
End: 2023-09-29

## 2023-09-29 ENCOUNTER — WEB ENCOUNTER (OUTPATIENT)
Dept: URBAN - METROPOLITAN AREA CLINIC 40 | Facility: CLINIC | Age: 83
End: 2023-09-29

## 2025-02-06 PROBLEM — 88281007: Status: ACTIVE | Noted: 2025-02-06

## 2025-02-10 ENCOUNTER — LAB OUTSIDE AN ENCOUNTER (OUTPATIENT)
Dept: URBAN - METROPOLITAN AREA CLINIC 74 | Facility: CLINIC | Age: 85
End: 2025-02-10

## 2025-02-10 ENCOUNTER — OFFICE VISIT (OUTPATIENT)
Dept: URBAN - METROPOLITAN AREA CLINIC 74 | Facility: CLINIC | Age: 85
End: 2025-02-10
Payer: MEDICARE

## 2025-02-10 VITALS
HEART RATE: 73 BPM | BODY MASS INDEX: 21.85 KG/M2 | DIASTOLIC BLOOD PRESSURE: 70 MMHG | OXYGEN SATURATION: 97 % | HEIGHT: 64 IN | SYSTOLIC BLOOD PRESSURE: 124 MMHG | WEIGHT: 128 LBS

## 2025-02-10 DIAGNOSIS — Z86.79 HISTORY OF ATRIAL FIBRILLATION: ICD-10-CM

## 2025-02-10 DIAGNOSIS — Z86.16 HISTORY OF COVID-19: ICD-10-CM

## 2025-02-10 DIAGNOSIS — Z79.01 CHRONIC ANTICOAGULATION: ICD-10-CM

## 2025-02-10 DIAGNOSIS — K21.9 GASTROESOPHAGEAL REFLUX DISEASE, UNSPECIFIED WHETHER ESOPHAGITIS PRESENT: ICD-10-CM

## 2025-02-10 DIAGNOSIS — K55.1 MESENTERIC ARTERY STENOSIS: ICD-10-CM

## 2025-02-10 DIAGNOSIS — K62.5 RECTAL BLEEDING: ICD-10-CM

## 2025-02-10 DIAGNOSIS — R10.32 LLQ PAIN: ICD-10-CM

## 2025-02-10 DIAGNOSIS — R14.0 GASSINESS: ICD-10-CM

## 2025-02-10 DIAGNOSIS — Z95.0 PACEMAKER: ICD-10-CM

## 2025-02-10 DIAGNOSIS — R10.31 RLQ ABDOMINAL PAIN: ICD-10-CM

## 2025-02-10 DIAGNOSIS — I77.1 CELIAC ARTERY STENOSIS: ICD-10-CM

## 2025-02-10 DIAGNOSIS — K58.0 IRRITABLE BOWEL SYNDROME WITH DIARRHEA: ICD-10-CM

## 2025-02-10 DIAGNOSIS — Z80.0 FAMILY HISTORY OF COLON CANCER: ICD-10-CM

## 2025-02-10 PROBLEM — 301716002: Status: ACTIVE | Noted: 2025-02-10

## 2025-02-10 PROBLEM — 116289008: Status: ACTIVE | Noted: 2025-02-10

## 2025-02-10 PROBLEM — 12063002: Status: ACTIVE | Noted: 2025-02-10

## 2025-02-10 PROCEDURE — 99214 OFFICE O/P EST MOD 30 MIN: CPT | Performed by: INTERNAL MEDICINE

## 2025-02-10 RX ORDER — PANTOPRAZOLE SODIUM 40 MG/1
1 TABLET TABLET, DELAYED RELEASE ORAL ONCE A DAY
Qty: 90 | Refills: 3 | Status: ACTIVE | COMMUNITY

## 2025-02-12 ENCOUNTER — TELEPHONE ENCOUNTER (OUTPATIENT)
Dept: URBAN - METROPOLITAN AREA CLINIC 74 | Facility: CLINIC | Age: 85
End: 2025-02-12

## 2025-02-12 PROBLEM — 735593008: Status: ACTIVE | Noted: 2025-02-12

## 2025-02-12 RX ORDER — SULFAMETHOXAZOLE AND TRIMETHOPRIM 800; 160 MG/1; MG/1
1 TABLET TABLET ORAL
Qty: 4 | OUTPATIENT
Start: 2025-02-12 | End: 2025-02-22

## 2025-02-17 ENCOUNTER — TELEPHONE ENCOUNTER (OUTPATIENT)
Dept: URBAN - METROPOLITAN AREA CLINIC 40 | Facility: CLINIC | Age: 85
End: 2025-02-17

## 2025-02-20 ENCOUNTER — WEB ENCOUNTER (OUTPATIENT)
Dept: URBAN - METROPOLITAN AREA CLINIC 74 | Facility: CLINIC | Age: 85
End: 2025-02-20

## 2025-02-20 ENCOUNTER — TELEPHONE ENCOUNTER (OUTPATIENT)
Dept: URBAN - METROPOLITAN AREA CLINIC 74 | Facility: CLINIC | Age: 85
End: 2025-02-20

## 2025-02-20 ENCOUNTER — OFFICE VISIT (OUTPATIENT)
Dept: URBAN - METROPOLITAN AREA CLINIC 74 | Facility: CLINIC | Age: 85
End: 2025-02-20

## 2025-02-20 PROBLEM — 427910000: Status: ACTIVE | Noted: 2025-02-20

## 2025-02-20 RX ORDER — CIPROFLOXACIN HYDROCHLORIDE 500 MG/1
1 TABLET TABLET, FILM COATED ORAL
Qty: 20 TABLET | OUTPATIENT
Start: 2025-02-20 | End: 2025-03-02

## 2025-02-20 RX ORDER — SACCHAROMYCES BOULARDII 50 MG
AS DIRECTED CAPSULE ORAL
OUTPATIENT
Start: 2025-02-20

## 2025-02-20 RX ORDER — METRONIDAZOLE 500 MG/1
1 TABLET TABLET ORAL THREE TIMES A DAY
Qty: 30 TABLET | Refills: 0 | OUTPATIENT
Start: 2025-02-20 | End: 2025-03-02